# Patient Record
Sex: MALE | Race: WHITE
[De-identification: names, ages, dates, MRNs, and addresses within clinical notes are randomized per-mention and may not be internally consistent; named-entity substitution may affect disease eponyms.]

---

## 2020-06-05 ENCOUNTER — HOSPITAL ENCOUNTER (EMERGENCY)
Dept: HOSPITAL 88 - ER | Age: 40
Discharge: HOME | End: 2020-06-05
Payer: SELF-PAY

## 2020-06-05 VITALS — BODY MASS INDEX: 27.17 KG/M2 | HEIGHT: 73 IN | WEIGHT: 205 LBS

## 2020-06-05 DIAGNOSIS — K21.9: ICD-10-CM

## 2020-06-05 DIAGNOSIS — T63.511A: Primary | ICD-10-CM
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
## 2020-06-05 PROCEDURE — 73100 X-RAY EXAM OF WRIST: CPT

## 2020-06-05 PROCEDURE — 99283 EMERGENCY DEPT VISIT LOW MDM: CPT

## 2020-06-05 NOTE — XMS REPORT
Continuity of Care Document

                             Created on: 2020



DANIELLE MAURICE

External Reference #: [id number]

: 1980

Sex: Male



Demographics





                          Address                   [address] [address] 

[address]

[place]  [address]

 

                          Home Phone                [phone]

 

                          Preferred Language        English

 

                          Marital Status            Unknown

 

                          Mormon Affiliation     Unknown

 

                          Race                      Unknown

 

                          Ethnic Group              Unknown





Author





                          Author                    [facility]

t

 

                          Organization              [facility]

 

                          Address                   [address]

[place]  [address]



 

                          Phone                     Unavailable







Support





                Name            Relationship    Address         Phone

 

                    TORRES VILLANUEVA    PRS                 [address]

[place], [place]  [address] [phone]

 

                    NONE,  OTHER        PRS                 [address]

[place], [place]  [address] [phone]

 

                    TORRES VILLANUEVA    PRS                 [address] [place]  [address] [phone]







Care Team Providers





                    Care Team Member Name Role                Phone

 

                          Unavailable               Unavailable







Payers





           Payer Name Policy Type Policy Number Effective Date Expiration Date S

ource







Problems

This patient has no known problems.



Allergies, Adverse Reactions, Alerts





        Allergy Name Allergy Type Status  Severity Reaction(s) Onset Date Inacti

ve Date 

Treating Clinician        Comments                  Source

 

       Penicillins DA     Active U             2020 00:00:00              

        [facility]

 

       Penicillins DA     Active U             2017 00:00:00              

        [facility]







Medications

This patient has no known medications.



Procedures

This patient has no known procedures.



Results





           Test Description Test Time  Test Comments Results    Result Comments 

Source

 

                    INFLUENZA A B       2020 14:19:00   

 

                                        Test Item

 

             INFLUENZA A (test code = FLUAPCR) Negative     Negative            

       

 

             INFLUENZA B (test code = FLUBPCR) Negative     Negative            

       





- XR CHEST 1 -96-49 13:58:00 FAX: Josy Dominguez [phone]
   [place]:   St: PRE----------
---------------------------------------------------------------------  Name:   DANIELLE NICHOLS                    [facility]                    : [birth year]  Age/S: 39/M           [address] [place] [address]         Unit #: [id number]    
 Loc: [place] [address]                 Phys: Josy Rolle
                                              Acct: [id number] Dis Date:      
        Status: PRE ER                                 PHONE #: [phone]    
Exam Date:     2020     1353                   FAX #: [phone]     
Reason: Cough                                              EXAMS:               
                               CPT CODE:      [id number] XR CHEST 1 V            
                  [address]                    PROCEDURE: CHEST SINGLE VIEW         
     INDICATION: Cough               COMPARISON: None.               FINDINGS: 
The lungs are clear. The pleura, cardiomediastinal       silhouette and bony 
thorax are normal. No vascular congestion is       present.                 IMPR
ESSION: No acute cardiopulmonary process.                   SL:  [id number]   
              ** Electronically Signed by RICHIE Joseph **          ** 
            on 2020 at 4221              **                      Reported 
and signed by: Barry Joseph M.D.                          CC: Josy MAYS                                                                      
                                             Technologist: RT Lima(TRISTEN) 
                                 Trnagus Date/Time/By: 2020 (9271) : By: 
[email or web address]          Orig Print D/T: S: 2020 (3798)                        
PAGE  1                       Signed Report                               - XR 
CHEST 1 -05-05 14:06:00                                                   
[place]:   St: REG----------
---------------------------------------------------------------------  Name:   DANIELLE NICHOLS                    [facility]                    : [birth year]  Age/S: 39/M           [address] [place] [address]         Unit #: [id number]    
 Loc: [place] [address]                 Phys: Aydin Love MD   
                                              Acct: [id number] Dis Date:      
        Status: REG ER                                 PHONE #: [phone]    
Exam Date:     2019     1350                   FAX #: [phone]     
Reason: Weakness                                           EXAMS:               
                               CPT CODE:      [id number] XR CHEST 1 V            
                  23419                    CHEST, ONE VIEW:                 H
ISTORY: Acute generalized weakness               COMPARISON EXAM(S):  2019 
             FINDINGS:  This single portable view was obtained at 1349 hours on 
     2019 and shows the cardiac silhouette to be normal and the lungs     
 clear. No pleural fluid or evidence of pneumothorax.               Well-healed 
fracture is identified in the middle third of the left       clavicle.  The s
keletal structures are otherwise unremarkable.                                  
IMPRESSION:         1.  No acute changes.         2.  Stable appearance of the c
hest compared to 2019.         3.  Well healed left clavicle fracture.     
                                                     SL:01                  ** 
Electronically Signed by RICHIE Clay **          **               on
2019 at 1406              **                      Reported and signed by: 
Bertin Clay M.D.              CC:                                       
                                                                                
                Technologist: RT Saravanan(TRISTEN)                          
    Trnscrd Date/Time/By: 2019 (4669) : By: Arina           Orig Print
D/T: S: 2019 (1760)                         PAGE  1                       
Signed Report                               - CT HEAD/BRAIN W/O [id number] 
13:58:00  Name: DANIELLE MAURICE                     [facility]               
    : 1980 Age/S: 39  / M         [address] [place] [address]         Unit 
#: [id number]     Loc:               [place] [address]                 Phys: 
Aydin Love MD                                                  Acct: 
[id number]  Dis Date:               Status: REG ER                            
     PHONE #: [phone]     Exam Date: 2019  1345                     
FAX #: [phone]      Reason: dizzy                                          
    EXAMS:                                               CPT CODE:      
[id number] CT HEAD/BRAIN W/O CONT                     [address]330                    CT
head without contrast 2019               HISTORY: Dizziness.              
PROCEDURE: Multiple axial images from the skull base to the skull       vertex 
were obtained without contrast.  Coronal and sagittal       reconstructed images
were performed       CT imaging performed at this location utilizes radiation 
dose       optimization techniques which include one or more of the following:  
    -Automated exposure control       -Adjustment of the mA and/or kV according 
to patient size       -Use of iterative reconstruction technique       CT 
Radiation Dose .7 mGy-cm               Comparison is made to 2017  
            FINDINGS: No acute intracranial hemorrhage, midline shift, extra-
axial       fluid collection, or hydrocephalus is present.  No cortical       
hypodensity to suggest an acute infarct is noted.  Gray-white       
differentiation is within normal limits.  The visualized mastoid air       cells
are clear.  There is no air-fluid level in the visualized       paranasal 
sinuses.                 IMPRESSION:         No acute intracranial abnormality. 
                 SL:  [id number]                  ** Electronically Signed by 
RICHIE Joseph **          **              on 2019 at 1358        
     **                      Reported and signed by: Barry Joseph M.D.    
      CC:                                                                       
                                                                 
Technologist:Sergio Martinez, RT(R)(CT)       CTDI:        DLP:        Trnscb 
Date/Time: 2019 (5327) [email or web address]                       Orig Print D/T: S:
2019 (9909)      PAGE  1                       Signed Report              
                TROPONIN-I [id number] 13:54:00* 



             Test Item    Value        Reference Range Interpretation Comments

 

             TROPONIN-I RAPID (test code = TROPIRAP) 0.01 ng/mL   0.00-0.08    N

            Performed by 

certified  at  [facility]          Negative:          <= 0.08   
      Positive:          >= 0.09An elevated troponin value alone is not 
sufficient todiagnose a myocardial infarction. Rather, the patient sclinical 
presentation (history, physical exam) and ECGshould be used in conjunction with 
troponin in thediagnostic evaluation of suspected myocardial infarction. Aserial
sampling protocol is recommended to facilitate the identification of temporal 
changes in troponin levels characteristic of MI.





CBC W/AUTO [id number] 13:48:00* 



             Test Item    Value        Reference Range Interpretation Comments

 

             WHITE BLOOD CELL (test code = WBC) 5.94 x10 3/uL 4.5-11.0     N    

         

 

             RED BLOOD CELL (test code = RBC) 5.49 x10 6/uL 4.00-5.60    N      

       

 

             HEMOGLOBIN (test code = HGB) 16.0 g/dL    12.5-16.9    N           

  

 

             HEMATOCRIT (test code = HCT) 48.4 %       37.5-50.7    N           

  

 

             MEAN CELL VOLUME (test code = MCV) 88.2 fL      81.0-99.0    N     

        

 

             MEAN CELL HGB (test code = MCH) 29.1 pg      27.0-33.0    N        

     

 

             MEAN CELL HGB CONCETRATION (test code = MCHC) 33.1 g/dL    33.0-37.

0    N             

 

             RED CELL DISTRIBUTION WIDTH CV (test code = RDW) 13.8 %       11.5-

14.5    N             

 

             RED CELL DISTRIBUTION WIDTH SD (test code = RDW-SD) 44.3 fL      37

.0-54.0    N             

 

             PLATELET COUNT (test code = PLT) 251 x10 3/uL 150-400      N       

      

 

             MEAN PLATELET VOLUME (test code = MPV) 10.6 fL      7.0-9.0      H 

            

 

             NEUTROPHIL % (test code = NT%) 64.2 %       56.0-77.0    N         

    

 

             IMMATURE GRANULOCYTE % (test code = IG%) 0.2 %        0.0-2.0      

N             

 

             LYMPHOCYTE % (test code = LY%) 23.7 %       14.0-32.0    N         

    

 

             MONOCYTE % (test code = MO%) 5.9 %        4.8-9.0      N           

  

 

             EOSINOPHIL % (test code = EO%) 5.7 %        0.3-3.7      H         

    

 

             BASOPHIL % (test code = BA%) 0.3 %        0.0-2.0      N           

  

 

             NUCLEATED RBC % (test code = NRBC%) 0.0 %        0-0          N    

         

 

             NEUTROPHIL # (test code = NT#) 3.81 x10 3/uL 2.0-7.6      N        

     

 

             IMMATURE GRANULOCYTE # (test code = IG#) 0.01 x10 3/uL 0.00-0.03   

 N             

 

             LYMPHOCYTE # (test code = LY#) 1.41 x10 3/uL 1.0-3.8      N        

     

 

             MONOCYTE # (test code = MO#) 0.35 x10 3/uL 0.1-0.8      N          

   

 

             EOSINOPHIL # (test code = EO#) 0.34 x10 3/uL 0.0-0.2      H        

     

 

             BASOPHIL # (test code = BA#) 0.02 x10 3/uL 0.0-0.2      N          

   

 

             NUCLEATED RBC # (test code = NRBC#) 0.00 x10 3/uL 0.0-0.1      N   

          

 

             MANUAL DIFF REQUIRED (test code = MDIFF) NO                        

              





CHEMISTRY 8 [id number] 13:46:00* 



             Test Item    Value        Reference Range Interpretation Comments

 

             ISTAT-SODIUM (test code = NAP)  MMOL/L      134-147                

    

 

             ISTAT-POTASSIUM (test code = KP)  MMOL/L      3.4-5.0              

      

 

             ISTAT-CHLORIDE (test code = CLP)  MMOL/L      100-108              

      

 

             ISTAT CARBON DIOXIDE (test code = ISTAT-CO2)  mmol/L      21-33    

    N             

 

             ISTAT CALCIUM IONIZED (test code = ISTAT-ROCKY)  MG/DL       1.12-1.3

2                  

 

             ISTAT-GLUCOSE (test code = GLUP)  MG/DL              H       

      

 

             ISTAT-BUN (test code = BUNP)  MG/DL       7-18         N           

  

 

             BEDSIDE CREATININE (test code = CREATBED)  MG/DL       0.6-1.3     

 N             

 

             GLOMERULAR FILTRATION RATE POC (test code = GFRBED) 72 ML/MIN      

                         





CHEMISTRY 8 [id number] 13:46:00* 



             Test Item    Value        Reference Range Interpretation Comments

 

             ISTAT-SODIUM (test code = NAP) 141 MMOL/L   134-147      N         

    

 

             ISTAT-POTASSIUM (test code = KP) 3.9 MMOL/L   3.4-5.0      N       

      

 

             ISTAT-CHLORIDE (test code = CLP) 105 MMOL/L   100-108      N       

     Performed by certified 

 at  [facility]

 

             ISTAT CARBON DIOXIDE (test code = ISTAT-CO2) 25.0 mmol/L  21-33    

    N             

 

             ISTAT CALCIUM IONIZED (test code = ISTAT-ROCKY) 1.17 MG/DL   1.12-1.3

2    N             

 

             ISTAT-GLUCOSE (test code = GLUP) 113 MG/DL           H       

      

 

             ISTAT-BUN (test code = BUNP) 18 MG/DL     7-18         N           

  

 

             BEDSIDE CREATININE (test code = CREATBED) 1.2 MG/DL    0.6-1.3     

 N             

 

             GLOMERULAR FILTRATION RATE POC (test code = GFRBED) 72 ML/MIN      

                         





-  ABDOMEN [id number] 23:34:00  Name: DANIELLE MAURICE                     
[facility]                    : 1980 Age/S: 39  / M         [address] [place] [address]         Unit #: [id number]     Loc:               [place] [address]                 Phys: Danielle Henry                               
                  Acct: [id number]  Dis Date:               Status: REG ER    
                             PHONE #: [phone]     Exam Date: 2019  
2320                     FAX #: [phone]      Reason: RUQ and R rib pain 
with inspiration, tender to      EXAMS:                                         
     CPT CODE:      [id number] US ABDOMEN LTD                             [address]  
                         PROCEDURE: RUQ U/S               CLINICAL INDICATION: 
Abdominal pain               COMPARISON: CT abdomen dated 17              
TECHNIQUE: Sonographic imaging is obtained of the right upper quadrant       of 
the abdomen               FINDINGS:                LIVER: There is normal liver 
contour, size, and morphology with normal       parenchymal echotexture.       
BILE DUCTS: The intrahepatic and extrahepatic bile ducts are not       dilated 
with the common bile duct measuring right mm.       GALLBLADDER: There is no 
gallstone, gallbladder sludge,       pericholecystic fluid, or wall thickening. 
       PANCREAS: The visualized pancreas appears unremarkable.       KIDNEY: The
right kidney measures 10.6 cm in length. There is normal       renal contour and
morphology, with normal parenchymal echotexture.       There is no 
hydronephrosis.        AORTA AND INFERIOR VENA CAVA: Visualized portions are 
unremarkable.       ASCITES: There is no ascites.                  IMPRESSION:  
                No acute abnormality.                             SL:01         
        ** Electronically Signed by RICHIE Chavis **          **       
      on 2019 at 2334              **                      Reported and 
signed by: Rudy Chavis M.D.        CC: Danielle MAYS                 
                                                                                
                    Technologist: Odilia Gallegos RDMS(A)(OB)                       
        Trnscb Date/Time: 2019 (4909) tSTEPHANIE                         
Orig Print D/T: S: 2019 (8604)     Probe:                       PAGE  1   
                   Signed Report                               COMPREHENSIVE 
METABOLIC [id number] 23:31:00* 



             Test Item    Value        Reference Range Interpretation Comments

 

             SODIUM (test code = NA) 137 mEq/L    134-147      N             

 

             POTASSIUM (test code = K) 4.0 mEq/L    3.4-5.0      N             

 

             CHLORIDE (test code = CL) 104 mEq/L    100-108      N             

 

             CARBON DIOXIDE (test code = CO2) 28 mEq/L     21-33        N       

      

 

             ANION GAP (test code = GAP) 9            0-20         N            

 

 

             GLUCOSE (test code = GLU) 102 mg/dL           N             

 

             BLOOD UREA NITROGEN (test code = BUN) 14 mg/dL     7-18         N  

           

 

             GLOMERULAR FILTRATION RATE (test code = GFR) 74.5         105-110  

    L            Units of measure = 

ml/min/1.73 m2

 

             CREATININE (test code = CREAT) 1.1 mg/dL    0.6-1.3      N         

    

 

             TOTAL PROTEIN (test code = PROT) 7.4 g/dL     6.4-8.2      N       

      

 

             ALBUMIN (test code = ALB) 3.70 g/dL    3.4-5.0      N             

 

             CALCIUM (test code = CA) 8.6 mg/dL    8.0-10.5     N             

 

             BILIRUBIN TOTAL (test code = BILT) 0.40 mg/dL   0.0-1.0      N     

        

 

             SGOT/AST (test code = AST) 26 IUnit/L   15-37        N             

 

             SGPT/ALT (test code = ALT) 26 IUnit/L   15-65        N             

 

             ALKALINE PHOSPHATASE TOTAL (test code = ALKP) 53 IUnit/L     

     N             





[id number] 23:31:00* 



             Test Item    Value        Reference Range Interpretation Comments

 

             LIPASE (test code = LIP) 175 IUnit/L         N             





URINALYSIS [id number] 23:31:00* 



             Test Item    Value        Reference Range Interpretation Comments

 

             UA COLOR (test code = COLU)              YEL/STRAW                 

 

 

             UA APPEARANCE (test code = APPU)              CLEAR                

      

 

             UA GLUCOSE DIPSTICK (test code = DGLUU)              NEGATIVE      

             

 

             UA BILIRUBIN DIPSTICK (test code = BILU)              NEGATIVE     

              

 

             UA KETONE DIPSTICK (test code = KETU)              NEGATIVE        

           

 

             UA SPECIFIC GRAVITY (test code = SGU)              1.005-1.030     

           

 

             UA BLOOD DIPSTICK (test code = MALGORZATA)              NEGATIVE          

         

 

             UA PH DIPSTICK (test code = JADA)              5.0-7.0              

      

 

             UA PROTEIN DIPSTICK (test code = PROU)              NEGATIVE       

            

 

             UA UROBILINIOGEN DIPSTICK (test code = URO)  mg/dL       0.2-1.0   

                 

 

             UA NITRITE DIPSTICK (test code = CORA)              NEGATIVE       

            

 

             UA LEUKOCYTE ESTERASE DIPSTICK (test code = LEUU)              NEGA

TIVE                   

 

             UA WBC (test code = WBCU) 0-3 WBC/HPF  0-3                        

 

             UA RBC (test code = RBCU) 0-3 RBC/HPF  0-3                        

 

             UA BACTERIA (test code = BACU) TRACE /HPF   NONE SEEN              

    

 

             UA SQUAMOUS CELLS (test code = SQU) 0-5 /HPF     NONE SEEN         

         





URINALYSIS [id number] 23:31:00* 



             Test Item    Value        Reference Range Interpretation Comments

 

             UA COLOR (test code = COLU) YELLOW       YEL/STRAW                 

 

 

             UA APPEARANCE (test code = APPU) CLEAR        CLEAR                

      

 

             UA GLUCOSE DIPSTICK (test code = DGLUU) NEGATIVE     NEGATIVE      

             

 

             UA BILIRUBIN DIPSTICK (test code = BILU) NEGATIVE     NEGATIVE     

              

 

             UA KETONE DIPSTICK (test code = KETU) NEGATIVE     NEGATIVE        

           

 

             UA SPECIFIC GRAVITY (test code = SGU) 1.015        1.005-1.030  N  

           

 

             UA BLOOD DIPSTICK (test code = MALGORZATA) NEGATIVE     NEGATIVE          

         

 

             UA PH DIPSTICK (test code = JADA) 6.0          5.0-7.0      N       

      

 

             UA PROTEIN DIPSTICK (test code = PROU) NEGATIVE     NEGATIVE       

            

 

             UA UROBILINIOGEN DIPSTICK (test code = URO) 0.2 mg/dL    0.2-1.0   

                 

 

             UA NITRITE DIPSTICK (test code = CORA) NEGATIVE     NEGATIVE       

            

 

             UA LEUKOCYTE ESTERASE DIPSTICK (test code = LEUU) 1+           NEGA

TIVE     A             

 

             UA WBC (test code = WBCU) 0-3 WBC/HPF  0-3                        

 

             UA RBC (test code = RBCU) 0-3 RBC/HPF  0-3                        

 

             UA BACTERIA (test code = BACU) TRACE /HPF   NONE SEEN              

    

 

             UA SQUAMOUS CELLS (test code = SQU) 0-5 /HPF     NONE SEEN         

         





CBC W/AUTO [id number] 23:15:00* 



             Test Item    Value        Reference Range Interpretation Comments

 

             WHITE BLOOD CELL (test code = WBC) 8.63 x10 3/uL 4.5-11.0     N    

         

 

             RED BLOOD CELL (test code = RBC) 5.22 x10 6/uL 4.00-5.60    N      

       

 

             HEMOGLOBIN (test code = HGB) 15.4 g/dL    12.5-16.9    N           

  

 

             HEMATOCRIT (test code = HCT) 46.7 %       37.5-50.7    N           

  

 

             MEAN CELL VOLUME (test code = MCV) 89.5 fL      81.0-99.0    N     

        

 

             MEAN CELL HGB (test code = MCH) 29.5 pg      27.0-33.0    N        

     

 

             MEAN CELL HGB CONCETRATION (test code = MCHC) 33.0 g/dL    33.0-37.

0    N             

 

             RED CELL DISTRIBUTION WIDTH CV (test code = RDW) 12.4 %       11.5-

14.5    N             

 

             RED CELL DISTRIBUTION WIDTH SD (test code = RDW-SD) 41.0 fL      37

.0-54.0    N             

 

             PLATELET COUNT (test code = PLT) 295 x10 3/uL 150-400      N       

      

 

             MEAN PLATELET VOLUME (test code = MPV) 10.4 fL      7.0-9.0      H 

            

 

             NEUTROPHIL % (test code = NT%) 65.6 %       56.0-77.0    N         

    

 

             IMMATURE GRANULOCYTE % (test code = IG%) 0.3 %        0.0-2.0      

N             

 

             LYMPHOCYTE % (test code = LY%) 23.8 %       14.0-32.0    N         

    

 

             MONOCYTE % (test code = MO%) 6.5 %        4.8-9.0      N           

  

 

             EOSINOPHIL % (test code = EO%) 3.6 %        0.3-3.7      N         

    

 

             BASOPHIL % (test code = BA%) 0.2 %        0.0-2.0      N           

  

 

             NUCLEATED RBC % (test code = NRBC%) 0.0 %        0-0          N    

         

 

             NEUTROPHIL # (test code = NT#) 5.66 x10 3/uL 2.0-7.6      N        

     

 

             IMMATURE GRANULOCYTE # (test code = IG#) 0.03 x10 3/uL 0.00-0.03   

 N             

 

             LYMPHOCYTE # (test code = LY#) 2.05 x10 3/uL 1.0-3.8      N        

     

 

             MONOCYTE # (test code = MO#) 0.56 x10 3/uL 0.1-0.8      N          

   

 

             EOSINOPHIL # (test code = EO#) 0.31 x10 3/uL 0.0-0.2      H        

     

 

             BASOPHIL # (test code = BA#) 0.02 x10 3/uL 0.0-0.2      N          

   

 

             NUCLEATED RBC # (test code = NRBC#) 0.00 x10 3/uL 0.0-0.1      N   

          

 

             MANUAL DIFF REQUIRED (test code = MDIFF) NO                        

              





- XR RIBS UNI W/CXR 3+V [id number] 23:06:00 FAX:         Danielle Henry   
[phone]    [place]:   St: REG----------
---------------------------------------------------------------------  Name:   DANIELLE NICHOLS                    [facility]                    : [birth year]  Age/S: 39/M           [address] [place] [address]         Unit #: [id number]    
 Loc: [place] [address]                 Phys: Danielle Henry   
                                              Acct: [id number] Dis Date:      
        Status: REG ER                                 PHONE #: [phone]    
Exam Date:     2019     2250                   FAX #: [phone]     
Reason: Rib pain with palpation 5,6,7, painful inspirat    EXAMS:               
                               CPT CODE:      [id number] XR RIBS UNI W/CXR 3+V RT
                  [id number]                    EXAM:  CR, XR RIBS UNI W/chest 3+ 
view RT: 2019, 2242 hours               HISTORY: Right-sided rib pain for 2 
weeks               TECHNIQUE: Right-sided rib series with 1 view of the chest. 
             COMPARISON: Chest radiograph dated 2017               FI
NDINGS:        Chest:       Trachea is midline.  Heart is mildly enlarged.  Pulm
onary vascularity       is indistinct.          There is airspace consolidation,
pleural effusion or no pneumothorax.        Old left clavicular fracture.       
       Right-sided ribs:       No right-sided rib fractures identified.         
               IMPRESSION:         1.  No right-sided rib fracture seen.        
2.  No acute cardiopulmonary disease.                             SL: [[id number]] 
        ** Electronically Signed by RICHIE Garcia on 2019 at 2306 **    
                 Reported and signed by: Yuri Garcia M.D.               CC: 
Danielle MAYS                                                               
                                                       Technologist: RT Randall(TRISTEN)                                  Trnscrd Date/Time/By: 2019 
(8276) : By: [email or web address]          Orig Print D/T: S: 2019 (9843)           
             PAGE  1                       Signed Report

## 2020-06-05 NOTE — DIAGNOSTIC IMAGING REPORT
WRIST LEFT 2 VIEWS - 3 views



HISTORY:  Pain. Sting ray bite.



COMPARISON: None available.

     

FINDINGS:

Bones:

No acute displaced fracture.  

Osseous alignment is within normal limits.



Joints:

The joint spaces are well-maintained.



Soft tissues:

Marked soft tissue swelling of the proximal palmar aspect of the hand.





IMPRESSION: 



Marked soft tissue swelling of the proximal palmar aspect of the hand.



Signed by: Dr. KALEB Nolan M.D. on 6/5/2020 8:02 PM

## 2020-06-05 NOTE — EMERGENCY DEPARTMENT NOTE
History of Present Illnes


History of Present Illness


Chief Complaint:  General Medicine Complaints


History of Present Illness


This is a 40 year old  male presents to ER with complaint of being 

stung in the left wrist by a stingray while at [facility] today about an hour 

prior to arrival. Patient states the whole bar was still intact at stating that 

he can tell. Complains of numbness and pain to the left hand..


Historian:  Patient


Arrival Mode:  Car


Onset (how long ago):  hour(s) (1)


Location:  left wrist


Quality:  pain, swelling, numbness


Radiation:  non-radiation


Severity:  moderate


Onset quality:  sudden


Duration (how long):  hour(s) (1)


Timing of current episode:  constant


Progression:  unchanged


Chronicity:  new


Context:  recent illness; trauma/injury (stung by stingray)


Relieving factors:  none


Exacerbating factors:  none


Associated symptoms:  denies other symptoms


Treatments prior to arrival:  none





Past Medical/Family History


Physician Review


I have reviewed the patient's past medical and family history.  Any updates have

been documented here.





Past Medical History


Recent Fever:  No


Clinical Suspicion of Infectio:  No


New/Unexplained Change in Ment:  No


Past Medical History:  GERD


Past Surgical History:  None





Social History


Alcohol Use:  Occasional


Any Illegal Drug Use:  No





Family History


Family history of heart diseas:  No





Other


Last Tetanus:  OOD





Review of Systems


Review of Systems


Constitutional:  no symptoms


EENTM:  no symptoms


Cardiovascular:  no symptoms


Respiratory:  no symptoms


Gastrointestinal:  no symptoms


Genitourinary:  no symptoms


Musculoskeletal:  as per HPI


Neurological:  no symptoms


Psychological:  no symptoms


Endocrine:  no symptoms


Hematological/Lymphatic:  no symptoms


Review of other systems


All other systems reviewed and negative.





Physical Exam


Related Data


Allergies:  


Coded Allergies:  


     Penicillins (Verified  Allergy, Severe, ANAPHYLAXIS, 20)


Triage Vital Signs





Vital Signs








  Date Time  Temp Pulse Resp B/P (MAP) Pulse Ox O2 Delivery O2 Flow Rate FiO2


 


20 19:19 97.0 62 18 140/97 98   








Vital signs reviewed:  Yes





Physical Exam


CONSTITUTIONAL





Constitutional:  well-developed, well-nourished, distressed (mild)


HENT


HENT:  normocephalic, atraumatic, oropharynx clear/moist, nose normal


HENT L/R:  left ext ear normal, right ext ear normal


EYES





Eyes:  PERRL, conjunctivae normal


NECK


Neck:  ROM normal


PULMONARY


Pulmonary:  effort normal, breath sounds normal


CARDIOVASCULAR





Cardiovascular:  regular rhythm, heart sounds normal, capillary refill normal, 

normal rate


GASTROINTESTINAL





Abdominal:  soft, nontender, bowel sounds normal


GENITOURINARY





Genitourinary:  exam deferred


SKIN


Skin:  warm, dry


MUSCULOSKELETAL





Musculoskeletal:  other (puncure wound left wrist with mild swelling and 

erythema, neuro vasulary intact, tendon exam intact)


NEUROLOGICAL





Neurological:  alert, oriented x 3, no gross motor or sensory deficits


PSYCHOLOGICAL


Psychological:  mood/affect normal, judgement normal





Results


Imaging


Imaging results reviewed:  Yes


Impressions


Patient Name: DANIELLE MAURICE                          MR #: [id number]     


        


: 1980                         Age/Sex: 40/M


Acct #: [id number]                    Req #: [id number]


Adm Physician:                                      


Ordered by: ILSA NUNN MD                    Report #: [id number] 


Location: ER                            Room/Bed:           


                                            ____________________________________


_______________________________________________________________





Procedure: [id number] DX/WRIST LEFT 2 VIEWS


Exam Date:                             Exam Time: 








                              REPORT STATUS: Signed





WRIST LEFT 2 VIEWS - 3 views





HISTORY:  Pain. Sting ray bite.





COMPARISON: None available.


     


FINDINGS:


Bones:


No acute displaced fracture.  


Osseous alignment is within normal limits.





Joints:


The joint spaces are well-maintained.





Soft tissues:


Marked soft tissue swelling of the proximal palmar aspect of the hand.








IMPRESSION: 





Marked soft tissue swelling of the proximal palmar aspect of the hand.





Signed by: Dr. KALEB Nolan M.D. on 2020 8:02 PM








Dictated By: GRUPO NOLAN MD, MD


Electronically Signed By: GRUPO NOLAN MD, MD on 20


Transcribed By: RISHI on 20





Critical Care Time


Subsequent provider


I assumed direction of critical care for this patient from another provider of 

my specialty.





Assessment & Plan


Assessment & Plan


Final Impression:  


(1) Toxic effect of contact with stingray, accidental (unintentional), initial 

encounter


Assessment & Plan


Patient presents to ER after being stung left wrist by a stingray today.





Left wrist x-ray ordered to eval for foreign body to left wrist.





Solu-Medrol 125 mg IM ordered. Benadryl 50 mg by mouth ordered. Norco 10/325 one

by mouth ordered.





At 2019 patient doing better still has pain but has improved. X-ray revealed no 

foreign body.





Patient be discharged on Medrol Dosepak. And Levaquin 750 mg by mouth daily to 

prevent infection. Patient also stated by over-the-counter Benadryl take 1-2 

every 4-6 hours as needed for itching and swelling.


Depart Disposition:  HOME, SELF-CARE


Last Vital Signs











  Date Time  Temp Pulse Resp B/P (MAP) Pulse Ox O2 Delivery O2 Flow Rate FiO2


 


20 19:19 97.0 62 18 140/97 98   








Medications in the ED





Methylprednisolone Sodium Succinate 125 mg ONCE  ONCE IM ;  Start 20 at 

19:30;  Stop 20 at 19:31;  Status DC


Diphenhydramine HCl 50 mg ONCE  ONCE PO ;  Start 20 at 19:30;  Stop 20 

at 19:31;  Status UNV


Acetaminophen/ Hydrocodone Bitart 1 ea ONCE  ONCE PO ;  Start 20 at 19:30;  

Stop 20 at 19:31;  Status UNV











ILSA NUNN MD         2020 19:40